# Patient Record
Sex: FEMALE | Race: WHITE | Employment: STUDENT | URBAN - METROPOLITAN AREA
[De-identification: names, ages, dates, MRNs, and addresses within clinical notes are randomized per-mention and may not be internally consistent; named-entity substitution may affect disease eponyms.]

---
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
## 2023-10-27 RX ORDER — NORGESTIMATE AND ETHINYL ESTRADIOL 7DAYSX3 LO
1 KIT ORAL DAILY
Qty: 84 TABLET | Refills: 3 | OUTPATIENT
Start: 2023-10-27

## 2023-10-27 NOTE — TELEPHONE ENCOUNTER
Pt due for annual on or after 11/21/23. Last seen by Elsa Carrillo on 11/21/2022. No future appts noted. Rx send back denied with recs for appt.

## 2023-11-06 NOTE — TELEPHONE ENCOUNTER
Pt is away at school in [place]. Pt scheduled physical with Iliana Stands for 11/22. Will take last pill on 11/12. Can Pt have refill until physical. Please send refill to [facility] in [place], Lorena Burnett (on file). Please call.

## 2023-11-07 RX ORDER — NORGESTIMATE AND ETHINYL ESTRADIOL 7DAYSX3 LO
1 KIT ORAL DAILY
Qty: 28 TABLET | Refills: 0 | Status: SHIPPED | OUTPATIENT
Start: 2023-11-07 | End: 2024-11-06

## 2023-11-22 ENCOUNTER — OFFICE VISIT (OUTPATIENT)
Dept: OBGYN CLINIC | Facility: CLINIC | Age: 18
End: 2023-11-22

## 2023-11-22 VITALS
SYSTOLIC BLOOD PRESSURE: 133 MMHG | DIASTOLIC BLOOD PRESSURE: 86 MMHG | BODY MASS INDEX: 26.51 KG/M2 | HEART RATE: 60 BPM | WEIGHT: 149.63 LBS | HEIGHT: 63 IN

## 2023-11-22 DIAGNOSIS — Z01.419 WELL WOMAN EXAM WITH ROUTINE GYNECOLOGICAL EXAM: Primary | ICD-10-CM

## 2023-11-22 DIAGNOSIS — Z30.41 ORAL CONTRACEPTIVE PILL SURVEILLANCE: ICD-10-CM

## 2023-11-22 PROCEDURE — 3079F DIAST BP 80-89 MM HG: CPT | Performed by: NURSE PRACTITIONER

## 2023-11-22 PROCEDURE — 99395 PREV VISIT EST AGE 18-39: CPT | Performed by: NURSE PRACTITIONER

## 2023-11-22 PROCEDURE — 3075F SYST BP GE 130 - 139MM HG: CPT | Performed by: NURSE PRACTITIONER

## 2023-11-22 PROCEDURE — 3008F BODY MASS INDEX DOCD: CPT | Performed by: NURSE PRACTITIONER

## 2023-11-22 RX ORDER — NORGESTIMATE AND ETHINYL ESTRADIOL 7DAYSX3 LO
1 KIT ORAL DAILY
Qty: 84 TABLET | Refills: 4 | Status: SHIPPED | OUTPATIENT
Start: 2023-11-22 | End: 2024-11-21

## 2023-11-22 RX ORDER — TOPIRAMATE 50 MG/1
50 TABLET, FILM COATED ORAL EVERY EVENING
COMMUNITY
Start: 2023-11-01

## 2023-11-22 RX ORDER — TOPIRAMATE 25 MG/1
25 TABLET ORAL EVERY EVENING
COMMUNITY
Start: 2023-08-06 | End: 2023-11-22

## 2023-12-03 DIAGNOSIS — Z30.41 ORAL CONTRACEPTIVE PILL SURVEILLANCE: ICD-10-CM

## 2023-12-04 RX ORDER — NORGESTIMATE AND ETHINYL ESTRADIOL
1 KIT DAILY
Qty: 28 TABLET | Refills: 0 | OUTPATIENT
Start: 2023-12-04

## 2023-12-04 NOTE — TELEPHONE ENCOUNTER
Requested Prescriptions     Pending Prescriptions Disp Refills    TRI-LO-MAX 0.18/0.215/0.25 MG-25 MCG Oral Tab [Pharmacy Med Name: TRI-LO-MAX TABLET] 28 tablet 0     Sig: TAKE 1 TABLET BY MOUTH EVERY DAY     Last filled 11/22/23 x 1 year

## 2023-12-28 ENCOUNTER — OFFICE VISIT (OUTPATIENT)
Dept: FAMILY MEDICINE CLINIC | Facility: CLINIC | Age: 18
End: 2023-12-28
Payer: COMMERCIAL

## 2023-12-28 VITALS
BODY MASS INDEX: 26.58 KG/M2 | OXYGEN SATURATION: 99 % | SYSTOLIC BLOOD PRESSURE: 112 MMHG | WEIGHT: 150 LBS | HEART RATE: 72 BPM | RESPIRATION RATE: 16 BRPM | HEIGHT: 63 IN | DIASTOLIC BLOOD PRESSURE: 62 MMHG | TEMPERATURE: 99 F

## 2023-12-28 DIAGNOSIS — H73.011 BULLOUS MYRINGITIS OF RIGHT EAR: Primary | ICD-10-CM

## 2023-12-28 PROCEDURE — 99203 OFFICE O/P NEW LOW 30 MIN: CPT | Performed by: NURSE PRACTITIONER

## 2023-12-28 PROCEDURE — 3078F DIAST BP <80 MM HG: CPT | Performed by: NURSE PRACTITIONER

## 2023-12-28 PROCEDURE — 3074F SYST BP LT 130 MM HG: CPT | Performed by: NURSE PRACTITIONER

## 2023-12-28 PROCEDURE — 3008F BODY MASS INDEX DOCD: CPT | Performed by: NURSE PRACTITIONER

## 2023-12-28 RX ORDER — CEFDINIR 300 MG/1
300 CAPSULE ORAL 2 TIMES DAILY
Qty: 20 CAPSULE | Refills: 0 | Status: SHIPPED | OUTPATIENT
Start: 2023-12-28 | End: 2024-01-07

## 2024-01-08 ENCOUNTER — OFFICE VISIT (OUTPATIENT)
Dept: OTOLARYNGOLOGY | Facility: CLINIC | Age: 19
End: 2024-01-08
Payer: COMMERCIAL

## 2024-01-08 VITALS — WEIGHT: 151 LBS | HEIGHT: 63 IN | BODY MASS INDEX: 26.75 KG/M2

## 2024-01-08 DIAGNOSIS — H66.91 ACUTE OTITIS MEDIA WITH PERFORATION, RIGHT: Primary | ICD-10-CM

## 2024-01-08 DIAGNOSIS — H72.91 ACUTE OTITIS MEDIA WITH PERFORATION, RIGHT: Primary | ICD-10-CM

## 2024-01-08 PROCEDURE — 99242 OFF/OP CONSLTJ NEW/EST SF 20: CPT | Performed by: SPECIALIST

## 2024-01-08 PROCEDURE — 3008F BODY MASS INDEX DOCD: CPT | Performed by: SPECIALIST

## 2024-01-08 RX ORDER — PREDNISONE 20 MG/1
20 TABLET ORAL DAILY
Qty: 3 TABLET | Refills: 0 | Status: SHIPPED | OUTPATIENT
Start: 2024-01-08

## 2024-01-08 RX ORDER — AZITHROMYCIN 250 MG/1
TABLET, FILM COATED ORAL
Qty: 11 TABLET | Refills: 0 | Status: SHIPPED | OUTPATIENT
Start: 2024-01-08

## 2024-01-08 NOTE — PROGRESS NOTES
Rosa De León is a 18 year old female.   Chief Complaint   Patient presents with    Ear Problem     Ear infection in right ear    Ringing In Ear     Ringing in right ear     HPI:   Patient here had draining and pain in her right ear.  Had been seen in an immediate care and given cefdinir.    Current Outpatient Medications   Medication Sig Dispense Refill    azithromycin (ZITHROMAX Z-CHRIS) 250 MG Oral Tab Take 1 by oral route every day for 10 days. 2 tablets today. 11 tablet 0    predniSONE 20 MG Oral Tab Take 1 tablet (20 mg total) by mouth daily. 3 tablet 0    topiramate 50 MG Oral Tab Take 1 tablet (50 mg total) by mouth every evening.      Norgestim-Eth Estrad Triphasic (ORTHO TRI-CYCLEN LO) 0.18/0.215/0.25 MG-25 MCG Oral Tab Take 1 tablet by mouth daily. 84 tablet 4    Ergocalciferol (VITAMIN D OR) Take by mouth daily.        Past Medical History:   Diagnosis Date    Acne     Migraine headache     seen by neuro previously; has been on preventive therapy previously with topomax and also sumatriptan       Social History:  Social History     Socioeconomic History    Marital status: Single   Occupational History    Occupation: freshman [employer]   Tobacco Use    Smoking status: Never    Smokeless tobacco: Never   Vaping Use    Vaping Use: Never used   Substance and Sexual Activity    Alcohol use: Yes     Comment: SOCIALLY    Drug use: Never    Sexual activity: Yes     Birth control/protection: Condom, Pill   Other Topics Concern    Exercise Yes     Comment: barre classes        REVIEW OF SYSTEMS:   GENERAL HEALTH: feels well otherwise  GENERAL : denies fever, chills, sweats, weight loss, weight gain  SKIN: denies any unusual skin lesions or rashes  RESPIRATORY: denies shortness of breath with exertion  NEURO: denies headaches    EXAM:   Ht 5' 3\" (1.6 m)   Wt 151 lb (68.5 kg)   LMP 12/11/2023 (Approximate)   BMI 26.75 kg/m²   System Details   Skin Inspection - Normal.   Constitutional Overall appearance -  Normal.   Head/Face Facial features - Normal. Eyebrows - Normal. Skull - Normal.   Eyes Conjunctiva - Right: Normal, Left: Normal. Pupil - Right: Normal, Left: Normal.    Ears Inspection - Right: Normal, Left: Normal.   Canal - Right: Normal, Left: Normal.   TM - Right: Near complete right serous otitis media with crusting over the central portion of the tympanic membrane.  No otorrhea on the date of the visit.  Left: Normal.   Nasal External nose - Normal.   Nasal septum - Normal.  Turbinates - Normal.   Oral/Oropharynx Lips - Normal, Tonsils - Normal, Tongue - Normal    Neck Exam Inspection - Normal. Palpation - Normal. Parotid gland - Normal. Thyroid gland - Normal.   Lymph Detail Submental. Submandibular. Anterior cervical. Posterior cervical. Supraclavicular all without enlargement   Psychiatric Orientation - Oriented to time, place, person & situation. Appropriate mood and affect.   Neurological Memory - Normal. Cranial nerves - Cranial nerves II through XII grossly intact.     ASSESSMENT AND PLAN:   1. Acute otitis media with perforation, right  Very likely acute otitis media with perforation which is now healed.  Patient had taken cefdinir.  Although somewhat improved symptoms are still persistent including otalgia.  Placed on a 10-day trial of Zithromycin and 3 days of prednisone.  Patient can follow-up on February 10 should her symptoms be persistent.  I can also remove the crust at that time should it not fall off on its own.      The patient indicates understanding of these issues and agrees to the plan.      Marci Silva MD  1/8/2024  10:44 AM

## 2024-01-08 NOTE — PATIENT INSTRUCTIONS
I believe you very likely had a perforation of your right tympanic membrane.  This looks now to be healed although there is a crust over the top of it.  I placed you on 10 days of Zithromycin and 3 days of prednisone.  Follow-up if this does not completely resolve.  If the crusting does not fall off it can be manually removed.  I will be in the office on Saturday, February 10.

## 2024-12-27 ENCOUNTER — OFFICE VISIT (OUTPATIENT)
Dept: OBGYN CLINIC | Facility: CLINIC | Age: 19
End: 2024-12-27
Payer: COMMERCIAL

## 2024-12-27 VITALS
BODY MASS INDEX: 28 KG/M2 | HEART RATE: 60 BPM | SYSTOLIC BLOOD PRESSURE: 129 MMHG | WEIGHT: 158.81 LBS | DIASTOLIC BLOOD PRESSURE: 78 MMHG

## 2024-12-27 DIAGNOSIS — Z11.3 ROUTINE SCREENING FOR STI (SEXUALLY TRANSMITTED INFECTION): ICD-10-CM

## 2024-12-27 DIAGNOSIS — Z30.41 ORAL CONTRACEPTIVE PILL SURVEILLANCE: ICD-10-CM

## 2024-12-27 DIAGNOSIS — Z12.4 SCREENING FOR CERVICAL CANCER: ICD-10-CM

## 2024-12-27 DIAGNOSIS — Z01.419 WELL WOMAN EXAM WITH ROUTINE GYNECOLOGICAL EXAM: Primary | ICD-10-CM

## 2024-12-27 PROCEDURE — 3078F DIAST BP <80 MM HG: CPT | Performed by: NURSE PRACTITIONER

## 2024-12-27 PROCEDURE — 99395 PREV VISIT EST AGE 18-39: CPT | Performed by: NURSE PRACTITIONER

## 2024-12-27 PROCEDURE — 3074F SYST BP LT 130 MM HG: CPT | Performed by: NURSE PRACTITIONER

## 2024-12-27 RX ORDER — NORGESTIMATE AND ETHINYL ESTRADIOL 7DAYSX3 LO
1 KIT ORAL DAILY
Qty: 84 TABLET | Refills: 4 | Status: SHIPPED | OUTPATIENT
Start: 2024-12-27 | End: 2025-12-27

## 2024-12-27 RX ORDER — ONDANSETRON 4 MG/1
1 TABLET, FILM COATED ORAL EVERY 8 HOURS PRN
COMMUNITY
Start: 2024-08-19

## 2024-12-27 NOTE — PROGRESS NOTES
[facility]    Obstetrics and Gynecology    Chief Complaint   Patient presents with    Gyn Exam     ANNUAL EXAM     Medication Request     OCP       Rosa De León is a 19 year old female  Patient's last menstrual period was 2024 (approximate). presenting for annual gynecology exam.  Taking ocps, happy with method, regular periods, menses lasts 5 days. No concerns with menses but has bad cramping always - takes advil.     Taking a daily medication for migraines, topiramate - helping. Rev'd using condoms while taking topiramate as it can decrease effectiveness of hormonal contraception. States migraines unrelated to period. Happen randomly each month.     She is seually active, same partner. Desires sti tesitng. No concerns with intercourse.     Senior at [employer]     Pap:never          Contraception:ocps  Mammo: n.a      OBSTETRICS HISTORY:  OB History    Para Term  AB Living   0 0 0 0 0 0   SAB IAB Ectopic Multiple Live Births   0 0 0 0 0       GYNE HISTORY:  Menarche: 10 (2024  9:58 AM)  Period Cycle (Days): MONTHLY (2024  9:58 AM)  Period Duration (Days): 4-5 DAYS (2024  9:58 AM)  Period Flow: NORMAL (2024  9:58 AM)  Use of Birth Control (if yes, specify type): OCP (2024  9:58 AM)  Date When Birth Control Last Used: 24 OCP (2024  9:58 AM)      History   Sexual Activity    Sexual activity: Yes    Birth control/ protection: Condom, Pill            No data to display                  MEDICAL HISTORY:  Past Medical History:    Acne    Migraine headache    seen by neuro previously; has been on preventive therapy previously with topomax and also sumatriptan      History reviewed. No pertinent surgical history.    SOCIAL HISTORY:  Social History     Socioeconomic History    Marital status: Single     Spouse name: Not on file    Number of children: Not on file    Years of education: Not on file    Highest education level: Not on file   Occupational  History    Occupation: [employer]   Tobacco Use    Smoking status: Never    Smokeless tobacco: Never   Vaping Use    Vaping status: Never Used   Substance and Sexual Activity    Alcohol use: Yes     Comment: SOCIALLY    Drug use: Never    Sexual activity: Yes     Birth control/protection: Condom, Pill   Other Topics Concern     Service Not Asked    Blood Transfusions Not Asked    Caffeine Concern Not Asked    Occupational Exposure Not Asked    Hobby Hazards Not Asked    Sleep Concern Not Asked    Stress Concern Not Asked    Weight Concern Not Asked    Special Diet Not Asked    Back Care Not Asked    Exercise Yes     Comment: barre classes    Bike Helmet Not Asked    Seat Belt Not Asked    Self-Exams Not Asked   Social History Narrative    Not on file     Social Drivers of Health     Financial Resource Strain: Not on file   Food Insecurity: Not on file   Transportation Needs: Not on file   Physical Activity: Not on file   Stress: Not on file   Social Connections: Not on file   Housing Stability: Not on file         Depression Screening (PHQ-2/PHQ-9): Over the LAST 2 WEEKS   Little interest or pleasure in doing things (over the last two weeks)?: Not at all    Feeling down, depressed, or hopeless (over the last two weeks)?: Not at all    PHQ-2 SCORE: 0           FAMILY HISTORY:  Family History   Problem Relation Age of Onset    No Known Problems Father     Other (Other) Mother         thyroid disorder    Arthritis Maternal Grandmother     Stroke Maternal Grandmother     Cancer Maternal Grandfather     High Blood Pressure Maternal Grandfather     High Cholesterol Maternal Grandfather     Other (Other) Paternal Grandmother         multiple sclerosis    Cancer Paternal Grandfather     No Known Problems Sister        MEDICATIONS:    Current Outpatient Medications:     ondansetron (ZOFRAN) 4 mg tablet, Take 1 tablet (4 mg total) by mouth every 8 (eight) hours as needed., Disp: , Rfl:      Norgestim-Eth Estrad Triphasic 0.18/0.215/0.25 MG-25 MCG Oral Tab, Take 1 tablet by mouth daily., Disp: 84 tablet, Rfl: 4    topiramate 50 MG Oral Tab, Take 1 tablet (50 mg total) by mouth every evening., Disp: , Rfl:     Ergocalciferol (VITAMIN D OR), Take by mouth daily., Disp: , Rfl:     ALLERGIES:  Allergies[1]      Review of Systems:  Constitutional:  Denies fatigue, night sweats, hot flashes  Eyes:  denies blurred or double vision  Cardiovascular:  denies chest pain or palpitations  Respiratory:  denies shortness of breath  Gastrointestinal:  denies heartburn, abdominal pain, diarrhea or constipation  Genitourinary:  denies dysuria, incontinence, abnormal vaginal discharge, vaginal itching,   Musculoskeletal:  denies back pain   Skin/Breast:  Denies any breast pain, lumps, or discharge.   Neurological:  denies headaches, extremity weakness or numbness.  Psychiatric: denies depression or anxiety.  Endocrine:   denies excessive thirst or urination.  Heme/Lymph:  denies history of anemia, easy bruising or bleeding.      PHYSICAL EXAM:     Vitals:    12/27/24 1004   BP: 129/78   Pulse: 60   Weight: 158 lb 12.8 oz (72 kg)       Body mass index is 28.13 kg/m².     Patient offered chaperone, patient declined    Constitutional: well developed, well nourished  Psychiatric:  Oriented to time, place, person and situation. Appropriate mood and affect  Head/Face: normocephalic  Neck/Thyroid: thyroid symmetric, no thyromegaly, no nodules, no adenopathy  Lymphatic:no abnormal supraclavicular or axillary adenopathy is noted  Breast: normal without palpable masses, tenderness, asymmetry, nipple discharge, nipple retraction or skin changes  Abdomen:  soft, nontender, nondistended, no masses  Skin/Hair: no unusual rashes or bruises  Extremities: no edema, no cyanosis    Pelvic Exam:  External Genitalia: normal appearance, hair distribution, and no lesions  Urethral Meatus:  normal in size, location, without lesions and  prolapse  Bladder:  No fullness, masses or tenderness  Vagina:  Normal appearance without lesions, no abnormal discharge  Cervix:  Normal without tenderness on motion  Uterus: normal in size, contour, position, mobility, without tenderness  Adnexa: normal without masses or tenderness  Perineum: normal  Anus: no hemorroids     Assessment & Plan:    ICD-10-CM    1. Well woman exam with routine gynecological exam  Z01.419       2. Screening for cervical cancer  Z12.4 ThinPrep PAP with HPV Reflex Request B      3. Routine screening for STI (sexually transmitted infection)  Z11.3 Chlamydia/Gc Amplification     Trichomonas vaginalis, PEDRO (Vaginal/Cervical)      4. Oral contraceptive pill surveillance  Z30.41 Norgestim-Eth Estrad Triphasic 0.18/0.215/0.25 MG-25 MCG Oral Tab         Reviewed ASCCP guidelines with the patient   Pap done today  Contraception: Using ocps. Desires to continue. Reviewed risks and benefits of oral contraceptives. Reviewed to call or go to ER if increased headaches, SOB, CP or leg pain with or without swelling.  Reviewed when to start OCPs, how to take OCPs, and when to use back up contraception  Patient verbalized understanding   --Encouraged condoms to prevent STD exposure - sti testing done  Breast Health:     Reviewed current guidelines with the patient and to start Mammograms at age 40  Reviewed monthly self breast exams with the patient   Discussed diet, exercise, MVIs with Ca/Vit D  Follow up in 1 yr for CAIO Laird    This note was prepared using Dragon Medical voice recognition dictation software. As a result errors may occur. When identified these errors have been corrected. While every attempt is made to correct errors during dictation discrepancies may still exist.         [1] No Known Allergies

## 2024-12-30 LAB
C TRACH DNA SPEC QL NAA+PROBE: NEGATIVE
N GONORRHOEA DNA SPEC QL NAA+PROBE: NEGATIVE
T VAGINALIS RRNA SPEC QL NAA+PROBE: NEGATIVE

## 2025-08-18 ENCOUNTER — APPOINTMENT (OUTPATIENT)
Age: 20
End: 2025-08-18

## (undated) NOTE — LETTER
1/8/2024          To Whom It May Concern:    Rosa De León is currently under my medical care.      Please excuse Rosa for her tardiness she was seen in office today Monday January 8, 2024.    If you require additional information please contact our office.        Sincerely,    Marci Silva MD